# Patient Record
(demographics unavailable — no encounter records)

---

## 2024-11-17 NOTE — HISTORY OF PRESENT ILLNESS
[Disease: _____________________] : Disease: [unfilled] [T: ___] : T[unfilled] [N: ___] : N[unfilled] [M: ___] : M[unfilled] [AJCC Stage: ____] : AJCC Stage: [unfilled] [de-identified] : In May 2024 at age 70 y/o with known PMHx of HTN, the patient first presented to [facility] ED c/o colicky abdominal pain for 1 day.  The patient had RLQ tenderness and was ordered for a CT A/P.  The CT A/P with IV and PO contrast performed on 5/4/24 demonstrated a large heterogeneous mass centered within the IVC at the level of the gallbladder fossa measuring 11.1 x 10.0 cm with internal areas of vascularity and cystic necrosis as well as calcifications.  The mass resulted in a left gonadal/ovarian vein congestion/varices and prominent azygos collaterals.  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  The patient was admitted for further work up and imaging.  A CT of the Chest with IV contrast performed on 5/5/24 was negative for metastatic disease.  An MRI of the Abdomen w/ and w/o contrast performed on 5/6/24 noted a large moderately T2 hyperintense enhancing solid mass in the IVC extending from the infrarenal IVC to just below the hepatic veins measuring 14.0 x 10.4 x 16.6 cm.  There were some bright T2 hyperintense non-enhancing components consistent with necrosis.  There were also prominent left jelani-aortic, azygos and hemiazygos collaterals.  The patient underwent IR guided biopsy of the mass on 5/8/24.  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   The patient presented on 5/13/24 to establish oncologic care.     June 2024 Resection by Dr. Tinsley  [de-identified] :  grade 2  [de-identified] : Intermediate grade IHC: positive for SMA and desmin and negative for , DOG1, MDM2, S100, CD34 and SOX10 [FreeTextEntry1] : surveillance 6/2024 [de-identified] : presents in follow up to review imaging no acute complaints

## 2024-11-17 NOTE — REVIEW OF SYSTEMS
[Diarrhea: Grade 0] : Diarrhea: Grade 0 [Negative] : Psychiatric [FreeTextEntry7] : abdominal bloating [Vomiting] : no vomiting [Constipation] : no constipation

## 2024-11-17 NOTE — PHYSICAL EXAM
[Fully active, able to carry on all pre-disease performance without restriction] : Status 0 - Fully active, able to carry on all pre-disease performance without restriction [Normal] : affect appropriate [de-identified] : breathing comfortably, no audible wheeze [de-identified] : regular rate

## 2024-11-17 NOTE — HISTORY OF PRESENT ILLNESS
[Disease: _____________________] : Disease: [unfilled] [T: ___] : T[unfilled] [N: ___] : N[unfilled] [M: ___] : M[unfilled] [AJCC Stage: ____] : AJCC Stage: [unfilled] [de-identified] : In May 2024 at age 68 y/o with known PMHx of HTN, the patient first presented to [facility] ED c/o colicky abdominal pain for 1 day.  The patient had RLQ tenderness and was ordered for a CT A/P.  The CT A/P with IV and PO contrast performed on 5/4/24 demonstrated a large heterogeneous mass centered within the IVC at the level of the gallbladder fossa measuring 11.1 x 10.0 cm with internal areas of vascularity and cystic necrosis as well as calcifications.  The mass resulted in a left gonadal/ovarian vein congestion/varices and prominent azygos collaterals.  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  The patient was admitted for further work up and imaging.  A CT of the Chest with IV contrast performed on 5/5/24 was negative for metastatic disease.  An MRI of the Abdomen w/ and w/o contrast performed on 5/6/24 noted a large moderately T2 hyperintense enhancing solid mass in the IVC extending from the infrarenal IVC to just below the hepatic veins measuring 14.0 x 10.4 x 16.6 cm.  There were some bright T2 hyperintense non-enhancing components consistent with necrosis.  There were also prominent left jelani-aortic, azygos and hemiazygos collaterals.  The patient underwent IR guided biopsy of the mass on 5/8/24.  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   The patient presented on 5/13/24 to establish oncologic care.     June 2024 Resection by Dr. Tinsley  [de-identified] :  grade 2  [de-identified] : Intermediate grade IHC: positive for SMA and desmin and negative for , DOG1, MDM2, S100, CD34 and SOX10 [FreeTextEntry1] : surveillance 6/2024 [de-identified] : presents in follow up to review imaging no acute complaints

## 2024-11-17 NOTE — PHYSICAL EXAM
[Fully active, able to carry on all pre-disease performance without restriction] : Status 0 - Fully active, able to carry on all pre-disease performance without restriction [Normal] : affect appropriate [de-identified] : breathing comfortably, no audible wheeze [de-identified] : regular rate

## 2024-11-19 NOTE — HISTORY OF PRESENT ILLNESS
[de-identified] : Ms. COURTNEY FISHER is a 69-year-old female presenting for 3 month follow-up s/p exploratory laparoscopy, RP mass resection, left hepatectomy with cholecystectomy, right nephrectomy, cardiopulmonary bypass with Dr. Goode, and IVC reconstruction with vascular Dr. Pura Castillo 06/10/2024 for leiomyosarcoma of the vena cava,  intermediate grade, negative margins (closest margin is anterior <0.01 cm), 0/3 LN. Her PMHx is significant for HTN,  x1; FMHx of pancreatic cancer (mother at age 54), colon cancer (maternal aunt in 70s), lung cancer (maternal aunt, progression from colon). No history of colonoscopy. Last mammogram and pap smear were 5+ years ago.  She presented to [facility] ED 2024 c/o colicky RLQ abdominal pain x1 day. Workup included an A/P CT with IV and PO contrast which demonstrated a large heterogeneous mass centered within the IVC at the level of the gallbladder fossa measuring 11.1 x 10.0 cm with internal areas of vascularity and cystic necrosis as well as calcifications. The mass resulted in a left gonadal/ovarian vein congestion/varices and prominent azygos collaterals. 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 The patient was admitted for further work up and imaging. A CT of the Chest with IV contrast performed on 2024 was negative for metastatic disease. An MRI of the Abdomen w/ and w/o contrast performed on 2024 noted a large moderately T2 hyperintense enhancing solid mass in the IVC extending from the infrarenal IVC to just below the hepatic veins measuring 14.0 x 10.4 x 16.6 cm. There were some bright T2 hyperintense non-enhancing components consistent with necrosis. There were also prominent left jelani-aortic, azygos and hemiazygos collaterals.  Underwent IR guided biopsy of the mass on 2024. 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 Sections showed a fascicular, cellular spindle cell neoplasm composed of cells with brightly eosinophilic cytoplasm and moderate nuclear pleomorphism. Focal necrosis was seen. Performed immunostains showed that the tumor cells are positive for SMA and desmin and negative for , DOG1, MDM2, S100, CD34 and SOX10. FNA cytopathology of the mass was non diagnostic.  She established care with heme/onc Dr. Gerardo Witt of 2024, recommended upfront resection if feasible and if concern for + or close margins then favored neoadjuvant RT over chemo. At her initial consultation 2024, I discussed that she has a leiomyosarcoma arising from the smooth muscle in vena cava. It is involving the right kidney, blocking drainage from left kidney and extend up behind the liver almost to the heart. This is a very aggressive tumor that requires an aggressive treatment approach. Discussed that this does look technically resectable but will require a complex operation in conjunction with cardiothoracic surgeon and vascular surgeon.   Patient s/p exploratory laparoscopy, RP mass resection, left hepatectomy with cholecystectomy, right nephrectomy, cardiopulmonary bypass with Dr. Goode, and IVC reconstruction with vascular Dr. Pura Castillo performed 06/10/2024. Final pathology revealed leiomyosarcoma, intermediate grade, negative margins (closest margin is anterior <0.01 cm), 0/3 LN. Mitotic rate is 15/10 HPFs; 15% necrosis identified. Preperitoneal fat excision pathology showed mesothelial-lined benign adipose tissue. Kidney findings were non-neoplastic. Hepatectomy negative for fibrosis, trichrome and reticulin stains. Iron stain highlights trace (1+) focal hepatocellular stainable iron. Very rare hepatocellular diastase resistant PAS inclusions seen.  Of note, patient saw vascular 2024 and was advised to follow-up at vascular clinic to obtain duplex of the IVC to evaluate bypass graft after incision staple removal. Right groin swelling was evaluated by vascular early 2024, now resolved.  At his post-op appointment 24, there was a small amount of pus at the right groin site, which was opened and packed with iodoform packing strip. Instructed the daughter how to pack this.  Encouraged her to follow-up as scheduled with vascular to assess leakage, make sure there is not pocket of fluid or they may wish to vac it. I also spoke to Dr. Castillo directly regarding the finding. Patient was healing appropriately at that time.  She saw med/onc Dr. Gerardo Witt 24 who advised there is no proven benefit of adjuvant chemotherapy or radiation for leiomyosarcomas. Planned on surveillance Q4 for the first 2-3 years post-op, then Q6 thereafter.  Surveillance CAP CT 2024 demonstrated no evidence of local recurrent or metastatic disease in the chest, abdomen, or pelvis. S/p IVC reconstruction with graft, patent.  Patient presents for 3 month follow-up. She is feeling well overall, eating well and staying active. She has returned to work. Right groin swelling has resolved. Scheduled to see medical oncology today.

## 2024-11-19 NOTE — ASSESSMENT
[FreeTextEntry1] : Ms. COURTNEY FISHER is a 69-year-old female presenting for postoperative evaluation s/p exploratory laparoscopy, RP mass resection, left hepatectomy with cholecystectomy, right nephrectomy, cardiopulmonary bypass with Dr. Goode, and IVC reconstruction with vascular DrAdan Castillo on 06/10/2024 for leiomyosarcoma of the vena cava, intermediate grade, negative margins (closest margin is anterior <0.01 cm), 0/3 LN.   Of note, patient saw vascular 07/02/2024 and was advised to follow-up at vascular clinic to obtain duplex of the IVC to evaluate bypass graft after incision staple removal. Had right groin swelling, now resolved.  She saw med/onc Dr. Gerardo Witt 8/12/24 who advised there is no proven benefit of adjuvant chemotherapy or radiation for leiomyosarcomas. Planned on surveillance Q4 for the first 2-3 years post-op, then Q6 thereafter.  Surveillance CAP CT 11/06/2024 which I reviewed and discussed demonstrated no evidence of local recurrent or metastatic disease in the chest, abdomen, or pelvis. S/p IVC reconstruction with graft, patent. Her incision is well healed on examination and she appears to have recovered well from a clinical standpoint - no restrictions moving forward and OK to resume full activity. Scheduled to f/u with med/onc Dr. Witt today. We will plan to see patient back in approximately 8 months after repeat CT with med/onc at that time.  Today, I personally spent 20 minutes in total time including reviewing imaging and studies, discussing complex treatment regimens, direct face to face time with the patient, patient education and counseling.

## 2024-11-19 NOTE — HISTORY OF PRESENT ILLNESS
[de-identified] : Ms. COURTNEY FISHER is a 69-year-old female presenting for 3 month follow-up s/p exploratory laparoscopy, RP mass resection, left hepatectomy with cholecystectomy, right nephrectomy, cardiopulmonary bypass with Dr. Goode, and IVC reconstruction with vascular Dr. Pura Castillo 06/10/2024 for leiomyosarcoma of the vena cava,  intermediate grade, negative margins (closest margin is anterior <0.01 cm), 0/3 LN. Her PMHx is significant for HTN,  x1; FMHx of pancreatic cancer (mother at age 54), colon cancer (maternal aunt in 70s), lung cancer (maternal aunt, progression from colon). No history of colonoscopy. Last mammogram and pap smear were 5+ years ago.  She presented to [facility] ED 2024 c/o colicky RLQ abdominal pain x1 day. Workup included an A/P CT with IV and PO contrast which demonstrated a large heterogeneous mass centered within the IVC at the level of the gallbladder fossa measuring 11.1 x 10.0 cm with internal areas of vascularity and cystic necrosis as well as calcifications. The mass resulted in a left gonadal/ovarian vein congestion/varices and prominent azygos collaterals. 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 The patient was admitted for further work up and imaging. A CT of the Chest with IV contrast performed on 2024 was negative for metastatic disease. An MRI of the Abdomen w/ and w/o contrast performed on 2024 noted a large moderately T2 hyperintense enhancing solid mass in the IVC extending from the infrarenal IVC to just below the hepatic veins measuring 14.0 x 10.4 x 16.6 cm. There were some bright T2 hyperintense non-enhancing components consistent with necrosis. There were also prominent left jelani-aortic, azygos and hemiazygos collaterals.  Underwent IR guided biopsy of the mass on 2024. 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 Sections showed a fascicular, cellular spindle cell neoplasm composed of cells with brightly eosinophilic cytoplasm and moderate nuclear pleomorphism. Focal necrosis was seen. Performed immunostains showed that the tumor cells are positive for SMA and desmin and negative for , DOG1, MDM2, S100, CD34 and SOX10. FNA cytopathology of the mass was non diagnostic.  She established care with heme/onc Dr. Gerardo Witt of 2024, recommended upfront resection if feasible and if concern for + or close margins then favored neoadjuvant RT over chemo. At her initial consultation 2024, I discussed that she has a leiomyosarcoma arising from the smooth muscle in vena cava. It is involving the right kidney, blocking drainage from left kidney and extend up behind the liver almost to the heart. This is a very aggressive tumor that requires an aggressive treatment approach. Discussed that this does look technically resectable but will require a complex operation in conjunction with cardiothoracic surgeon and vascular surgeon.   Patient s/p exploratory laparoscopy, RP mass resection, left hepatectomy with cholecystectomy, right nephrectomy, cardiopulmonary bypass with Dr. Goode, and IVC reconstruction with vascular Dr. Pura Castillo performed 06/10/2024. Final pathology revealed leiomyosarcoma, intermediate grade, negative margins (closest margin is anterior <0.01 cm), 0/3 LN. Mitotic rate is 15/10 HPFs; 15% necrosis identified. Preperitoneal fat excision pathology showed mesothelial-lined benign adipose tissue. Kidney findings were non-neoplastic. Hepatectomy negative for fibrosis, trichrome and reticulin stains. Iron stain highlights trace (1+) focal hepatocellular stainable iron. Very rare hepatocellular diastase resistant PAS inclusions seen.  Of note, patient saw vascular 2024 and was advised to follow-up at vascular clinic to obtain duplex of the IVC to evaluate bypass graft after incision staple removal. Right groin swelling was evaluated by vascular early 2024, now resolved.  At his post-op appointment 24, there was a small amount of pus at the right groin site, which was opened and packed with iodoform packing strip. Instructed the daughter how to pack this.  Encouraged her to follow-up as scheduled with vascular to assess leakage, make sure there is not pocket of fluid or they may wish to vac it. I also spoke to Dr. Castillo directly regarding the finding. Patient was healing appropriately at that time.  She saw med/onc Dr. Gerardo Witt 24 who advised there is no proven benefit of adjuvant chemotherapy or radiation for leiomyosarcomas. Planned on surveillance Q4 for the first 2-3 years post-op, then Q6 thereafter.  Surveillance CAP CT 2024 demonstrated no evidence of local recurrent or metastatic disease in the chest, abdomen, or pelvis. S/p IVC reconstruction with graft, patent.  Patient presents for 3 month follow-up. She is feeling well overall, eating well and staying active. She has returned to work. Right groin swelling has resolved. Scheduled to see medical oncology today.

## 2024-11-19 NOTE — HISTORY OF PRESENT ILLNESS
[de-identified] : Ms. COURTNEY FISHER is a 69-year-old female presenting for 3 month follow-up s/p exploratory laparoscopy, RP mass resection, left hepatectomy with cholecystectomy, right nephrectomy, cardiopulmonary bypass with Dr. Goode, and IVC reconstruction with vascular Dr. Pura Castillo 06/10/2024 for leiomyosarcoma of the vena cava,  intermediate grade, negative margins (closest margin is anterior <0.01 cm), 0/3 LN. Her PMHx is significant for HTN,  x1; FMHx of pancreatic cancer (mother at age 54), colon cancer (maternal aunt in 70s), lung cancer (maternal aunt, progression from colon). No history of colonoscopy. Last mammogram and pap smear were 5+ years ago.  She presented to [facility] ED 2024 c/o colicky RLQ abdominal pain x1 day. Workup included an A/P CT with IV and PO contrast which demonstrated a large heterogeneous mass centered within the IVC at the level of the gallbladder fossa measuring 11.1 x 10.0 cm with internal areas of vascularity and cystic necrosis as well as calcifications. The mass resulted in a left gonadal/ovarian vein congestion/varices and prominent azygos collaterals. 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 The patient was admitted for further work up and imaging. A CT of the Chest with IV contrast performed on 2024 was negative for metastatic disease. An MRI of the Abdomen w/ and w/o contrast performed on 2024 noted a large moderately T2 hyperintense enhancing solid mass in the IVC extending from the infrarenal IVC to just below the hepatic veins measuring 14.0 x 10.4 x 16.6 cm. There were some bright T2 hyperintense non-enhancing components consistent with necrosis. There were also prominent left jelani-aortic, azygos and hemiazygos collaterals.  Underwent IR guided biopsy of the mass on 2024. 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 Sections showed a fascicular, cellular spindle cell neoplasm composed of cells with brightly eosinophilic cytoplasm and moderate nuclear pleomorphism. Focal necrosis was seen. Performed immunostains showed that the tumor cells are positive for SMA and desmin and negative for , DOG1, MDM2, S100, CD34 and SOX10. FNA cytopathology of the mass was non diagnostic.  She established care with heme/onc Dr. Gerardo Witt of 2024, recommended upfront resection if feasible and if concern for + or close margins then favored neoadjuvant RT over chemo. At her initial consultation 2024, I discussed that she has a leiomyosarcoma arising from the smooth muscle in vena cava. It is involving the right kidney, blocking drainage from left kidney and extend up behind the liver almost to the heart. This is a very aggressive tumor that requires an aggressive treatment approach. Discussed that this does look technically resectable but will require a complex operation in conjunction with cardiothoracic surgeon and vascular surgeon.   Patient s/p exploratory laparoscopy, RP mass resection, left hepatectomy with cholecystectomy, right nephrectomy, cardiopulmonary bypass with Dr. Goode, and IVC reconstruction with vascular Dr. Pura Castillo performed 06/10/2024. Final pathology revealed leiomyosarcoma, intermediate grade, negative margins (closest margin is anterior <0.01 cm), 0/3 LN. Mitotic rate is 15/10 HPFs; 15% necrosis identified. Preperitoneal fat excision pathology showed mesothelial-lined benign adipose tissue. Kidney findings were non-neoplastic. Hepatectomy negative for fibrosis, trichrome and reticulin stains. Iron stain highlights trace (1+) focal hepatocellular stainable iron. Very rare hepatocellular diastase resistant PAS inclusions seen.  Of note, patient saw vascular 2024 and was advised to follow-up at vascular clinic to obtain duplex of the IVC to evaluate bypass graft after incision staple removal. Right groin swelling was evaluated by vascular early 2024, now resolved.  At his post-op appointment 24, there was a small amount of pus at the right groin site, which was opened and packed with iodoform packing strip. Instructed the daughter how to pack this.  Encouraged her to follow-up as scheduled with vascular to assess leakage, make sure there is not pocket of fluid or they may wish to vac it. I also spoke to Dr. Castillo directly regarding the finding. Patient was healing appropriately at that time.  She saw med/onc Dr. Gerardo Witt 24 who advised there is no proven benefit of adjuvant chemotherapy or radiation for leiomyosarcomas. Planned on surveillance Q4 for the first 2-3 years post-op, then Q6 thereafter.  Surveillance CAP CT 2024 demonstrated no evidence of local recurrent or metastatic disease in the chest, abdomen, or pelvis. S/p IVC reconstruction with graft, patent.  Patient presents for 3 month follow-up. She is feeling well overall, eating well and staying active. She has returned to work. Right groin swelling has resolved. Scheduled to see medical oncology today.

## 2024-11-19 NOTE — REASON FOR VISIT
[Follow-Up Visit] : a follow-up visit for [Spouse] : spouse [Family Member] : family member [FreeTextEntry2] : leiomyosarcoma s/p resection

## 2024-11-19 NOTE — END OF VISIT
[FreeTextEntry3] : By signing my name below, I, Melambertzeny Doe, attest that this documentation has been prepared under the direction and in the presence of Santosh Tinsley MD in the following sections HISTORY OF PRESENT ILLNESS; PAST MEDICAL/FAMILY/SOCIAL HISTORY; REVIEW OF SYSTEMS; PHYSICAL EXAM; ASSESSMENT/PLAN.

## 2024-11-19 NOTE — HISTORY OF PRESENT ILLNESS
[de-identified] : Ms. COURTNEY FISHER is a 69-year-old female presenting for 3 month follow-up s/p exploratory laparoscopy, RP mass resection, left hepatectomy with cholecystectomy, right nephrectomy, cardiopulmonary bypass with Dr. Goode, and IVC reconstruction with vascular Dr. Pura Castillo 06/10/2024 for leiomyosarcoma of the vena cava,  intermediate grade, negative margins (closest margin is anterior <0.01 cm), 0/3 LN. Her PMHx is significant for HTN,  x1; FMHx of pancreatic cancer (mother at age 54), colon cancer (maternal aunt in 70s), lung cancer (maternal aunt, progression from colon). No history of colonoscopy. Last mammogram and pap smear were 5+ years ago.  She presented to [facility] ED 2024 c/o colicky RLQ abdominal pain x1 day. Workup included an A/P CT with IV and PO contrast which demonstrated a large heterogeneous mass centered within the IVC at the level of the gallbladder fossa measuring 11.1 x 10.0 cm with internal areas of vascularity and cystic necrosis as well as calcifications. The mass resulted in a left gonadal/ovarian vein congestion/varices and prominent azygos collaterals. 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 The patient was admitted for further work up and imaging. A CT of the Chest with IV contrast performed on 2024 was negative for metastatic disease. An MRI of the Abdomen w/ and w/o contrast performed on 2024 noted a large moderately T2 hyperintense enhancing solid mass in the IVC extending from the infrarenal IVC to just below the hepatic veins measuring 14.0 x 10.4 x 16.6 cm. There were some bright T2 hyperintense non-enhancing components consistent with necrosis. There were also prominent left jelani-aortic, azygos and hemiazygos collaterals.  Underwent IR guided biopsy of the mass on 2024. 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 Sections showed a fascicular, cellular spindle cell neoplasm composed of cells with brightly eosinophilic cytoplasm and moderate nuclear pleomorphism. Focal necrosis was seen. Performed immunostains showed that the tumor cells are positive for SMA and desmin and negative for , DOG1, MDM2, S100, CD34 and SOX10. FNA cytopathology of the mass was non diagnostic.  She established care with heme/onc Dr. Gerardo Witt of 2024, recommended upfront resection if feasible and if concern for + or close margins then favored neoadjuvant RT over chemo. At her initial consultation 2024, I discussed that she has a leiomyosarcoma arising from the smooth muscle in vena cava. It is involving the right kidney, blocking drainage from left kidney and extend up behind the liver almost to the heart. This is a very aggressive tumor that requires an aggressive treatment approach. Discussed that this does look technically resectable but will require a complex operation in conjunction with cardiothoracic surgeon and vascular surgeon.   Patient s/p exploratory laparoscopy, RP mass resection, left hepatectomy with cholecystectomy, right nephrectomy, cardiopulmonary bypass with Dr. Goode, and IVC reconstruction with vascular Dr. Pura Castillo performed 06/10/2024. Final pathology revealed leiomyosarcoma, intermediate grade, negative margins (closest margin is anterior <0.01 cm), 0/3 LN. Mitotic rate is 15/10 HPFs; 15% necrosis identified. Preperitoneal fat excision pathology showed mesothelial-lined benign adipose tissue. Kidney findings were non-neoplastic. Hepatectomy negative for fibrosis, trichrome and reticulin stains. Iron stain highlights trace (1+) focal hepatocellular stainable iron. Very rare hepatocellular diastase resistant PAS inclusions seen.  Of note, patient saw vascular 2024 and was advised to follow-up at vascular clinic to obtain duplex of the IVC to evaluate bypass graft after incision staple removal. Right groin swelling was evaluated by vascular early 2024, now resolved.  At his post-op appointment 24, there was a small amount of pus at the right groin site, which was opened and packed with iodoform packing strip. Instructed the daughter how to pack this.  Encouraged her to follow-up as scheduled with vascular to assess leakage, make sure there is not pocket of fluid or they may wish to vac it. I also spoke to Dr. Castillo directly regarding the finding. Patient was healing appropriately at that time.  She saw med/onc Dr. Gerardo Witt 24 who advised there is no proven benefit of adjuvant chemotherapy or radiation for leiomyosarcomas. Planned on surveillance Q4 for the first 2-3 years post-op, then Q6 thereafter.  Surveillance CAP CT 2024 demonstrated no evidence of local recurrent or metastatic disease in the chest, abdomen, or pelvis. S/p IVC reconstruction with graft, patent.  Patient presents for 3 month follow-up. She is feeling well overall, eating well and staying active. She has returned to work. Right groin swelling has resolved. Scheduled to see medical oncology today.

## 2025-05-05 NOTE — HISTORY OF PRESENT ILLNESS
[Disease: _____________________] : Disease: [unfilled] [T: ___] : T[unfilled] [N: ___] : N[unfilled] [M: ___] : M[unfilled] [AJCC Stage: ____] : AJCC Stage: [unfilled] [de-identified] : In May 2024 at age 68 y/o with known PMHx of HTN, the patient first presented to [facility] ED c/o colicky abdominal pain for 1 day.  The patient had RLQ tenderness and was ordered for a CT A/P.  The CT A/P with IV and PO contrast performed on 5/4/24 demonstrated a large heterogeneous mass centered within the IVC at the level of the gallbladder fossa measuring 11.1 x 10.0 cm with internal areas of vascularity and cystic necrosis as well as calcifications.  The mass resulted in a left gonadal/ovarian vein congestion/varices and prominent azygos collaterals.  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  The patient was admitted for further work up and imaging.  A CT of the Chest with IV contrast performed on 5/5/24 was negative for metastatic disease.  An MRI of the Abdomen w/ and w/o contrast performed on 5/6/24 noted a large moderately T2 hyperintense enhancing solid mass in the IVC extending from the infrarenal IVC to just below the hepatic veins measuring 14.0 x 10.4 x 16.6 cm.  There were some bright T2 hyperintense non-enhancing components consistent with necrosis.  There were also prominent left jelani-aortic, azygos and hemiazygos collaterals.  The patient underwent IR guided biopsy of the mass on 5/8/24.  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   The patient presented on 5/13/24 to establish oncologic care.     June 2024 Resection by Dr. Tinsley  [de-identified] :  grade 2  [de-identified] : Intermediate grade IHC: positive for SMA and desmin and negative for , DOG1, MDM2, S100, CD34 and SOX10 [FreeTextEntry1] : surveillance 6/2024 [de-identified] : presents in follow up to review imaging no acute complaints

## 2025-05-05 NOTE — PHYSICAL EXAM
[Fully active, able to carry on all pre-disease performance without restriction] : Status 0 - Fully active, able to carry on all pre-disease performance without restriction [Normal] : affect appropriate [de-identified] : breathing comfortably, no audible wheeze [de-identified] : regular rate

## 2025-05-05 NOTE — REVIEW OF SYSTEMS
[Diarrhea: Grade 0] : Diarrhea: Grade 0 [Negative] : Psychiatric [Vomiting] : no vomiting [Constipation] : no constipation

## 2025-05-05 NOTE — HISTORY OF PRESENT ILLNESS
[Disease: _____________________] : Disease: [unfilled] [T: ___] : T[unfilled] [N: ___] : N[unfilled] [M: ___] : M[unfilled] [AJCC Stage: ____] : AJCC Stage: [unfilled] [de-identified] : In May 2024 at age 70 y/o with known PMHx of HTN, the patient first presented to [facility] ED c/o colicky abdominal pain for 1 day.  The patient had RLQ tenderness and was ordered for a CT A/P.  The CT A/P with IV and PO contrast performed on 5/4/24 demonstrated a large heterogeneous mass centered within the IVC at the level of the gallbladder fossa measuring 11.1 x 10.0 cm with internal areas of vascularity and cystic necrosis as well as calcifications.  The mass resulted in a left gonadal/ovarian vein congestion/varices and prominent azygos collaterals.  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  The patient was admitted for further work up and imaging.  A CT of the Chest with IV contrast performed on 5/5/24 was negative for metastatic disease.  An MRI of the Abdomen w/ and w/o contrast performed on 5/6/24 noted a large moderately T2 hyperintense enhancing solid mass in the IVC extending from the infrarenal IVC to just below the hepatic veins measuring 14.0 x 10.4 x 16.6 cm.  There were some bright T2 hyperintense non-enhancing components consistent with necrosis.  There were also prominent left jelani-aortic, azygos and hemiazygos collaterals.  The patient underwent IR guided biopsy of the mass on 5/8/24.  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   The patient presented on 5/13/24 to establish oncologic care.     June 2024 Resection by Dr. Tinsley  [de-identified] :  grade 2  [de-identified] : Intermediate grade IHC: positive for SMA and desmin and negative for , DOG1, MDM2, S100, CD34 and SOX10 [FreeTextEntry1] : surveillance 6/2024 [de-identified] : presents in follow up to review imaging no acute complaints

## 2025-05-05 NOTE — PHYSICAL EXAM
[Fully active, able to carry on all pre-disease performance without restriction] : Status 0 - Fully active, able to carry on all pre-disease performance without restriction [Normal] : affect appropriate [de-identified] : breathing comfortably, no audible wheeze [de-identified] : regular rate